# Patient Record
(demographics unavailable — no encounter records)

---

## 2024-11-20 NOTE — PROCEDURE
[Cervical Pap Smear] : cervical Pap smear [Liquid Base] : liquid base [GC Chlamydia Culture] : GC Chlamydia Culture [GC & Chlamydia via Pap] : GC & Chlamydia via Pap [Tolerated Well] : the patient tolerated the procedure well [No Complications] : there were no complications
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
## 2024-11-20 NOTE — PHYSICAL EXAM
[Chaperone Present] : A chaperone was present in the examining room during all aspects of the physical examination [61626] : A chaperone was present during the pelvic exam. [Appropriately responsive] : appropriately responsive [Alert] : alert [No Acute Distress] : no acute distress [No Lymphadenopathy] : no lymphadenopathy [Regular Rate Rhythm] : regular rate rhythm [No Murmurs] : no murmurs [Clear to Auscultation B/L] : clear to auscultation bilaterally [Soft] : soft [Non-tender] : non-tender [Non-distended] : non-distended [No HSM] : No HSM [No Lesions] : no lesions [No Mass] : no mass [Oriented x3] : oriented x3 [Examination Of The Breasts] : a normal appearance [No Masses] : no breast masses were palpable [Labia Majora] : normal [Labia Minora] : normal [Normal] : normal [Uterine Adnexae] : normal [Declined] : Patient declined rectal exam [FreeTextEntry2] : alessio

## 2024-11-20 NOTE — PHYSICAL EXAM
[Chaperone Present] : A chaperone was present in the examining room during all aspects of the physical examination [[id number]] : A chaperone was present during the pelvic exam. [Appropriately responsive] : appropriately responsive [Alert] : alert [No Acute Distress] : no acute distress [No Lymphadenopathy] : no lymphadenopathy [Regular Rate Rhythm] : regular rate rhythm [No Murmurs] : no murmurs [Clear to Auscultation B/L] : clear to auscultation bilaterally [Soft] : soft [Non-tender] : non-tender [Non-distended] : non-distended [No HSM] : No HSM [No Lesions] : no lesions [No Mass] : no mass [Oriented x3] : oriented x3 [Examination Of The Breasts] : a normal appearance [No Masses] : no breast masses were palpable [Labia Majora] : normal [Labia Minora] : normal [Normal] : normal [Uterine Adnexae] : normal [Declined] : Patient declined rectal exam [FreeTextEntry2] : alessio
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
## 2024-12-12 NOTE — PROCEDURE
[Colposcopy] : colposcopy [ASCUS] : atypical squamous cells of undetermined significance [HPV high risk] : PCR positive for high risk HPV [Patient] : patient [Risks] : risks [Benefits] : benefits [Alternatives] : alternatives [Consent Obtained] : written consent was obtained prior to the procedure [Ibuprofen ___ mg] : ibuprofen [unfilled] ~Umg [SCJ Fully Visulized] : the squamocolumnar junction was not fully visualized [Punctation ___ o'clock] : no punctation [Leukoplakia ___ o'clock] : leukoplakia at [unfilled] ~Uo'clock [Acetowhite ___ o'clock] : ascetowhite changes at [unfilled] ~Uo'clock [Mosaicism ___ o'clock] : no mosaicism [Atypical Vessels ___ o'clock] : no atypical vessels [Normal Staining] : normal staining [Biopsy] : the lesion was not biopsied [Biopsy Locations ___ o'clock] : the biopsies were taken at [unfilled] o'clock [ECC Done] : Endocervical curettage was performed.  [Phuc's] : Phuc's solution [Silver Nitrate] : silver nitrate [Direct Pressure] : direct pressure [Tolerated Well] : the patient tolerated the procedure well [No Complications] : there were no complications [Bleeding] : excesive bleeding was noted [FreeTextEntry8] : pain improved